# Patient Record
Sex: FEMALE | Race: WHITE | NOT HISPANIC OR LATINO
[De-identification: names, ages, dates, MRNs, and addresses within clinical notes are randomized per-mention and may not be internally consistent; named-entity substitution may affect disease eponyms.]

---

## 2021-05-06 ENCOUNTER — APPOINTMENT (OUTPATIENT)
Dept: PULMONOLOGY | Facility: CLINIC | Age: 41
End: 2021-05-06
Payer: COMMERCIAL

## 2021-05-06 VITALS
HEART RATE: 95 BPM | TEMPERATURE: 97.4 F | DIASTOLIC BLOOD PRESSURE: 84 MMHG | SYSTOLIC BLOOD PRESSURE: 120 MMHG | HEIGHT: 60 IN | WEIGHT: 140 LBS | OXYGEN SATURATION: 98 % | BODY MASS INDEX: 27.48 KG/M2

## 2021-05-06 DIAGNOSIS — F51.02 ADJUSTMENT INSOMNIA: ICD-10-CM

## 2021-05-06 DIAGNOSIS — R06.83 SNORING: ICD-10-CM

## 2021-05-06 DIAGNOSIS — G47.10 HYPERSOMNIA, UNSPECIFIED: ICD-10-CM

## 2021-05-06 DIAGNOSIS — R06.81 APNEA, NOT ELSEWHERE CLASSIFIED: ICD-10-CM

## 2021-05-06 PROBLEM — Z00.00 ENCOUNTER FOR PREVENTIVE HEALTH EXAMINATION: Status: ACTIVE | Noted: 2021-05-06

## 2021-05-06 PROCEDURE — 99072 ADDL SUPL MATRL&STAF TM PHE: CPT

## 2021-05-06 PROCEDURE — 99204 OFFICE O/P NEW MOD 45 MIN: CPT

## 2021-05-06 NOTE — PHYSICAL EXAM
[Well Groomed] : well groomed [General Appearance - In No Acute Distress] : no acute distress [Normal Conjunctiva] : the conjunctiva exhibited no abnormalities [Eyelids - No Xanthelasma] : the eyelids demonstrated no xanthelasmas [Normal Oropharynx] : normal oropharynx [III] : III [Neck Appearance] : the appearance of the neck was normal [Neck Cervical Mass (___cm)] : no neck mass was observed [Jugular Venous Distention Increased] : there was no jugular-venous distention [Thyroid Diffuse Enlargement] : the thyroid was not enlarged [Thyroid Nodule] : there were no palpable thyroid nodules [Heart Rate And Rhythm] : heart rate was normal and rhythm regular [Heart Sounds] : normal S1 and S2 [Heart Sounds Gallop] : no gallops [Murmurs] : no murmurs [Heart Sounds Pericardial Friction Rub] : no pericardial rub [] : no respiratory distress [Auscultation Breath Sounds / Voice Sounds] : lungs were clear to auscultation bilaterally [Involuntary Movements] : no involuntary movements were seen [No Focal Deficits] : no focal deficits [Oriented To Time, Place, And Person] : oriented to person, place, and time [Impaired Insight] : insight and judgment were intact [Affect] : the affect was normal

## 2021-05-06 NOTE — REVIEW OF SYSTEMS
[Fatigue] : fatigue [Recent Wt Loss (___ Lbs)] : recent [unfilled] ~Ulb weight loss [Snoring] : snoring [Witnessed Apneas] : witnessed apnea [Negative] : Psychiatric [de-identified] : pre-DM

## 2021-05-06 NOTE — ASSESSMENT
[FreeTextEntry1] : 41 y/o F with snoring, witnessed apnea, insomnia and RLS who is here for initial evaluation.   Concern for obstructive sleep apnea but sleep hygiene also poor.  Discussed sleep hygiene at length.  Suggested mild sleep restriction- adjust bed hours to expected sleep duration. If not sleepy, get out of bed.  Keep consistent wake time no matter how poor perceived sleep is.  Relaxing time before bed, "worry time" before that.  Suggested sleep schedule of 1 am  bedtime, 8  am wake time. Will have unattended home sleep testing

## 2021-05-06 NOTE — HISTORY OF PRESENT ILLNESS
[FreeTextEntry1] : 05/06/2021 :  SARAN GARCIA is a 40 year female with PMHx Pre DM, depression RLS, hypersomnolence,  loud snoring and witness apnea  who is here for initial evaluation. \par \par She was referred by her  PCP. She c/o of being  exhausted during a day and dozing off while working and driving.\par She noticed her symptoms got worst after started working from home. She is a director for .\par \par She intentionally lost about 19lb since last year and was able to maintain it. SHe also describes symptoms of RLS>1 year.\par \par \par Sleep Routine:\par \par She goes to bed at 12 , sleep latency is about 30 min, she wakes up 5x/night, WASO is about 5 min, and then she is up between 5-7A. She naps  everyday for 2 hours . Her ESS is 8/24.\par \par \par She lost 19 lb but denies cataplexy, RLS, parasomnia, or any other sleep behavioral issues. \par

## 2021-05-24 ENCOUNTER — TRANSCRIPTION ENCOUNTER (OUTPATIENT)
Age: 41
End: 2021-05-24

## 2021-05-24 ENCOUNTER — APPOINTMENT (OUTPATIENT)
Dept: SLEEP CENTER | Facility: HOME HEALTH | Age: 41
End: 2021-05-24
Payer: COMMERCIAL

## 2021-05-24 ENCOUNTER — OUTPATIENT (OUTPATIENT)
Dept: OUTPATIENT SERVICES | Facility: HOSPITAL | Age: 41
LOS: 1 days | End: 2021-05-24
Payer: COMMERCIAL

## 2021-05-24 DIAGNOSIS — G47.33 OBSTRUCTIVE SLEEP APNEA (ADULT) (PEDIATRIC): ICD-10-CM

## 2021-05-24 PROCEDURE — 95800 SLP STDY UNATTENDED: CPT

## 2021-05-24 PROCEDURE — 95800 SLP STDY UNATTENDED: CPT | Mod: 26

## 2021-05-27 ENCOUNTER — APPOINTMENT (OUTPATIENT)
Dept: PULMONOLOGY | Facility: CLINIC | Age: 41
End: 2021-05-27
Payer: COMMERCIAL

## 2021-05-27 VITALS
HEIGHT: 60 IN | OXYGEN SATURATION: 98 % | WEIGHT: 138 LBS | DIASTOLIC BLOOD PRESSURE: 83 MMHG | BODY MASS INDEX: 27.09 KG/M2 | TEMPERATURE: 97.9 F | SYSTOLIC BLOOD PRESSURE: 113 MMHG | HEART RATE: 101 BPM

## 2021-05-27 PROCEDURE — 99214 OFFICE O/P EST MOD 30 MIN: CPT

## 2021-05-27 NOTE — ASSESSMENT
[FreeTextEntry1] : obstructive sleep apnea, moderate\par \par Treatment options for sleep disordered breathing were discussed.  The most rapid and successful treatment remains nasal CPAP or BilevelPAP.  Alternatives include upper airway surgery such as uvulopharyngoplasty or a dental appliance (better for milder cases).  Recently hypoglossal nerve stimulation has been used.  Positional therapy (avoidance of supine posture) can be helpful, and all patients should try to maintain a healthy weight and avoid alcohol or other sedating medications close to bedtime\par \par Treatment will be ordered with autotitrating CPAP, which will be downloaded after a few weeks of use to check compliance and optimize pressure based on efficacy.  If not comfortable with this treatment, polysomnography with CPAP titration may be needed. Discussed choice of interface, cleaning, humidifier water, adjusting to CPAP, insurance rules for monitoring usage.\par \par Follow up after one month of use or earlier if any problems.\par \par Also would like her to be evaluated by ENT for possible surgery, perhaps to include tonsillectomy.

## 2021-05-27 NOTE — HISTORY OF PRESENT ILLNESS
[FreeTextEntry1] : 05/27/2021 :  SARAN GARCIA is a 40 year female who is here for f/u after unattended home sleep testing.  Evaluated for severe snoring, fragmented sleep, excessive daytime somnolence.  Since last seen sleep better with attention to sleep hygiene.\par unattended home sleep testing reviewed with patient- severe snoring, multiple wakes, moderate obstructive sleep apnea with Apnea Hypopnea Index 21.

## 2021-05-27 NOTE — PHYSICAL EXAM
[General Appearance - Well Developed] : well developed [Normal Appearance] : normal appearance [Well Groomed] : well groomed [General Appearance - Well Nourished] : well nourished [No Deformities] : no deformities [General Appearance - In No Acute Distress] : no acute distress [] : 3+ [FreeTextEntry1] : shallow hypopharynx

## 2021-05-27 NOTE — CONSULT LETTER
[Dear  ___] : Dear  [unfilled], [Consult Letter:] : I had the pleasure of evaluating your patient, [unfilled]. [Please see my note below.] : Please see my note below. [Consult Closing:] : Thank you very much for allowing me to participate in the care of this patient.  If you have any questions, please do not hesitate to contact me. [Sincerely,] : Sincerely, [FreeTextEntry2] : Vianey Becerra MD\[facility]  [FreeTextEntry3] : Jm Dia MD\par Director, [facility]\par [facility]\par [address]\[place] [address]\par [phone]

## 2021-05-28 ENCOUNTER — TRANSCRIPTION ENCOUNTER (OUTPATIENT)
Age: 41
End: 2021-05-28

## 2024-02-14 ENCOUNTER — NON-APPOINTMENT (OUTPATIENT)
Age: 44
End: 2024-02-14

## 2024-02-15 ENCOUNTER — APPOINTMENT (OUTPATIENT)
Dept: NEUROLOGY | Facility: CLINIC | Age: 44
End: 2024-02-15
Payer: COMMERCIAL

## 2024-02-15 VITALS
DIASTOLIC BLOOD PRESSURE: 87 MMHG | HEART RATE: 90 BPM | HEIGHT: 60 IN | SYSTOLIC BLOOD PRESSURE: 122 MMHG | BODY MASS INDEX: 28.66 KG/M2 | WEIGHT: 146 LBS | TEMPERATURE: 97.2 F | OXYGEN SATURATION: 100 %

## 2024-02-15 DIAGNOSIS — Z82.3 FAMILY HISTORY OF STROKE: ICD-10-CM

## 2024-02-15 DIAGNOSIS — Z83.3 FAMILY HISTORY OF DIABETES MELLITUS: ICD-10-CM

## 2024-02-15 DIAGNOSIS — Z82.49 FAMILY HISTORY OF ISCHEMIC HEART DISEASE AND OTHER DISEASES OF THE CIRCULATORY SYSTEM: ICD-10-CM

## 2024-02-15 PROCEDURE — 99214 OFFICE O/P EST MOD 30 MIN: CPT

## 2024-02-15 RX ORDER — SERTRALINE HYDROCHLORIDE 200 MG/1
200 CAPSULE ORAL
Refills: 0 | Status: ACTIVE | COMMUNITY

## 2024-02-18 NOTE — PHYSICAL EXAM
[FreeTextEntry1] : Physical Exam Constitutional: no apparent distress Psychiatric: normal affect, euthymic, alert and oriented x 3  Neurologic Exam: Mental Status: awake and alert, speech fluent and prosodic with no paraphasic errors Cranial Nerves: tracks in all directions, face symmetric, no dysarthria Motor: normal bulk and tone, no orbiting or pronator drift, power 5/5 to confrontation throughout including shoulder abduction, elbow flexion and extension, wrist flexion and extension, hip flexion, knee flexion and extension, no abnormal movements Sensation: intact to light touch in distal upper and lower extremities bilaterally Coordination: finger-nose-finger intact bilaterally Reflexes: 2+ biceps, triceps, brachioradialis, patella Gait: narrow base, normal stance and stride, normal arm swing

## 2024-02-18 NOTE — HISTORY OF PRESENT ILLNESS
[FreeTextEntry1] : Presents with complaint of involuntary leg kicking and jerking at night.   notices this morning than she does, but she sometimes notices it during the when her legs are resting.  Very tired during the day, has been diagnosed with sleep apnea but does not use CPAP.   Also has pain in right lower back/buttock that radiates down right leg.     Very tired Dxed sleep apnea but does not have CPAP machine Numbness in fingertips Neuropathy labs, iron studies Gabapentin

## 2024-02-18 NOTE — ASSESSMENT
[FreeTextEntry1] : 1) Restless leg syndrome -Start gabapentin 300 mg QHS  2) Right-sided sciatica  -Gabapentin as above  3) Fatigue/untreated sleep apnea -Follow up with Dr. Dia to initiate CPAP

## 2024-02-20 ENCOUNTER — TRANSCRIPTION ENCOUNTER (OUTPATIENT)
Age: 44
End: 2024-02-20

## 2024-02-20 ENCOUNTER — NON-APPOINTMENT (OUTPATIENT)
Age: 44
End: 2024-02-20

## 2024-02-20 LAB
ALBUMIN MFR SERPL ELPH: 58.2 %
ALBUMIN SERPL-MCNC: 4.9 G/DL
ALBUMIN/GLOB SERPL: 1.4 RATIO
ALPHA1 GLOB MFR SERPL ELPH: 3.4 %
ALPHA1 GLOB SERPL ELPH-MCNC: 0.3 G/DL
ALPHA2 GLOB MFR SERPL ELPH: 9.3 %
ALPHA2 GLOB SERPL ELPH-MCNC: 0.8 G/DL
B-GLOBULIN MFR SERPL ELPH: 13.7 %
B-GLOBULIN SERPL ELPH-MCNC: 1.2 G/DL
ESTIMATED AVERAGE GLUCOSE: 126 MG/DL
FERRITIN SERPL-MCNC: 15 NG/ML
GAMMA GLOB FLD ELPH-MCNC: 1.3 G/DL
GAMMA GLOB MFR SERPL ELPH: 15.4 %
HBA1C MFR BLD HPLC: 6 %
HCT VFR BLD CALC: 36.1 %
HGB BLD-MCNC: 11.3 G/DL
INTERPRETATION SERPL IEP-IMP: NORMAL
IRON SATN MFR SERPL: 6 %
IRON SERPL-MCNC: 34 UG/DL
M PROTEIN SPEC IFE-MCNC: NORMAL
MCHC RBC-ENTMCNC: 25.9 PG
MCHC RBC-ENTMCNC: 31.3 GM/DL
MCV RBC AUTO: 82.6 FL
PLATELET # BLD AUTO: 389 K/UL
PROT SERPL-MCNC: 8.4 G/DL
PROT SERPL-MCNC: 8.4 G/DL
RBC # BLD: 4.37 M/UL
RBC # FLD: 15.5 %
TIBC SERPL-MCNC: 577 UG/DL
TSH SERPL-ACNC: 2.59 UIU/ML
UIBC SERPL-MCNC: 543 UG/DL
VIT B12 SERPL-MCNC: 820 PG/ML
WBC # FLD AUTO: 9.16 K/UL

## 2024-02-20 RX ORDER — GABAPENTIN 300 MG/1
300 CAPSULE ORAL
Qty: 90 | Refills: 1 | Status: ACTIVE | COMMUNITY
Start: 2024-02-20 | End: 1900-01-01

## 2024-04-02 NOTE — REASON FOR VISIT
[Home] : at home, [unfilled] , at the time of the visit. [Medical Office: ([facility])___] : at the medical office located in  [Patient] : the patient [Follow-Up: _____] : a [unfilled] follow-up visit

## 2024-04-03 ENCOUNTER — APPOINTMENT (OUTPATIENT)
Dept: NEUROLOGY | Facility: CLINIC | Age: 44
End: 2024-04-03
Payer: COMMERCIAL

## 2024-04-03 DIAGNOSIS — G25.81 RESTLESS LEGS SYNDROME: ICD-10-CM

## 2024-04-03 DIAGNOSIS — R51.9 HEADACHE, UNSPECIFIED: ICD-10-CM

## 2024-04-03 DIAGNOSIS — G47.33 OBSTRUCTIVE SLEEP APNEA (ADULT) (PEDIATRIC): ICD-10-CM

## 2024-04-03 PROCEDURE — 99214 OFFICE O/P EST MOD 30 MIN: CPT

## 2024-04-03 NOTE — HISTORY OF PRESENT ILLNESS
[FreeTextEntry1] : 4/3/24 HPI: Karen is a 43 year old female presenting for a follow up visit for RLS. Last seen by Dr. Tapia on 2/15/24.  RLS symptoms improving, having issues <1x per week. Only taking the Gabapentin 300mg prn. Has not followed up with PCP regarding iron deficiency anemia, not taking iron supplements at this time.   Complaining of new daily headaches for the past 3-4 months. Worsened by stress and exhaustion. Very rarely associated with photophobia and nausea. Sometimes present first thing in the morning, can get better throughout the day but then worsen again at night. Taking Tylenol or Advil prn with relief. No history of imaging. Of note, pt has history of moderate JILLIAN, not on CPAP.

## 2024-04-03 NOTE — PHYSICAL EXAM
[FreeTextEntry1] : General: Constitutional: Sitting comfortably in NAD. Psychiatric: well-groomed, appropriate affect  Cognitive: Orientation, language, memory and knowledge screens intact.  Cranial Nerves: II: IAN. III/IV/VI: EOM Full. VII: Face appears symmetric. VIII: Normal to screening. IX/X: Normal phonation. XI: Trapezius Symmetric. XII: Tongue midline.

## 2024-04-03 NOTE — REVIEW OF SYSTEMS
[Fever] : no fever [Chills] : no chills [As Noted in HPI] : as noted in HPI [Chest Pain] : no chest pain [Shortness Of Breath] : no shortness of breath [Abdominal Pain] : no abdominal pain

## 2024-04-03 NOTE — DISCUSSION/SUMMARY
[FreeTextEntry1] : Impression: 1) RLS - symptoms improving, taking Gabapentin 300mg prn <1x per week  2) Iron deficiency anemia  3) New daily headaches  4) Moderate JILLIAN not on CPAP  Plan: 1) Continue Gabapentin 300mg prn. Recommended following up with PCP regarding iron deficiency anemia  2) MRI brain to rule out structural cause of new headaches 3) Trial of Magnesium Glycinate 400mg nightly 4) Recommended following up with Dr. Dia for CPAP, as untreated JILLIAN can contribute to daily headaches

## 2024-04-15 ENCOUNTER — APPOINTMENT (OUTPATIENT)
Dept: MRI IMAGING | Facility: HOSPITAL | Age: 44
End: 2024-04-15

## 2024-04-24 ENCOUNTER — APPOINTMENT (OUTPATIENT)
Dept: MRI IMAGING | Facility: HOSPITAL | Age: 44
End: 2024-04-24